# Patient Record
Sex: MALE | Race: WHITE | NOT HISPANIC OR LATINO | URBAN - METROPOLITAN AREA
[De-identification: names, ages, dates, MRNs, and addresses within clinical notes are randomized per-mention and may not be internally consistent; named-entity substitution may affect disease eponyms.]

---

## 2023-02-23 ENCOUNTER — OFFICE VISIT (OUTPATIENT)
Dept: URBAN - METROPOLITAN AREA CLINIC 68 | Facility: CLINIC | Age: 38
End: 2023-02-23

## 2023-02-23 NOTE — HPI-MIGRATED HPI
General : Patient has been experiencing abdominal discomfort. This is associated with lifting heavy objects bending down. It is mainly periumbilical. He has H/O umbilical hernia repair. Denies melena hematochezia hematemesis coffee ground emesis. He comes in today for follow up.    chest

## 2023-03-08 ENCOUNTER — TELEPHONE ENCOUNTER (OUTPATIENT)
Dept: URBAN - METROPOLITAN AREA CLINIC 68 | Facility: CLINIC | Age: 38
End: 2023-03-08

## 2023-03-08 ENCOUNTER — DASHBOARD ENCOUNTERS (OUTPATIENT)
Age: 38
End: 2023-03-08